# Patient Record
Sex: MALE | Race: WHITE | NOT HISPANIC OR LATINO | Employment: UNEMPLOYED | URBAN - METROPOLITAN AREA
[De-identification: names, ages, dates, MRNs, and addresses within clinical notes are randomized per-mention and may not be internally consistent; named-entity substitution may affect disease eponyms.]

---

## 2019-04-23 ENCOUNTER — OCCUPATIONAL HEALTH (OUTPATIENT)
Dept: URGENT CARE | Facility: CLINIC | Age: 49
End: 2019-04-23

## 2019-04-23 PROCEDURE — 80305 PR HAIR COLLECTION ONLY: ICD-10-PCS | Mod: S$GLB,,, | Performed by: EMERGENCY MEDICINE

## 2019-04-23 PROCEDURE — 80305 DRUG TEST PRSMV DIR OPT OBS: CPT | Mod: S$GLB,,, | Performed by: EMERGENCY MEDICINE

## 2020-01-14 ENCOUNTER — OCCUPATIONAL HEALTH (OUTPATIENT)
Dept: URGENT CARE | Facility: CLINIC | Age: 50
End: 2020-01-14

## 2020-01-14 PROCEDURE — 80305 PR HAIR COLLECTION ONLY: ICD-10-PCS | Mod: S$GLB,,, | Performed by: EMERGENCY MEDICINE

## 2020-01-14 PROCEDURE — 80305 DRUG TEST PRSMV DIR OPT OBS: CPT | Mod: S$GLB,,, | Performed by: EMERGENCY MEDICINE

## 2023-08-28 ENCOUNTER — OCCUPATIONAL HEALTH (OUTPATIENT)
Dept: URGENT CARE | Facility: CLINIC | Age: 53
End: 2023-08-28

## 2023-08-28 DIAGNOSIS — Z13.9 ENCOUNTER FOR SCREENING: Primary | ICD-10-CM

## 2023-08-28 LAB — COLLECTION ONLY: NORMAL

## 2023-08-28 PROCEDURE — 80305 DRUG TEST PRSMV DIR OPT OBS: CPT | Mod: S$GLB,,, | Performed by: EMERGENCY MEDICINE

## 2023-08-28 PROCEDURE — 80305 MEDTOX HAIR COLLECTION ONLY: ICD-10-PCS | Mod: S$GLB,,, | Performed by: EMERGENCY MEDICINE

## 2024-08-20 ENCOUNTER — HOSPITAL ENCOUNTER (OUTPATIENT)
Dept: PREADMISSION TESTING | Facility: HOSPITAL | Age: 54
Discharge: HOME | End: 2024-08-20
Attending: INTERNAL MEDICINE
Payer: COMMERCIAL

## 2024-08-20 DIAGNOSIS — Z01.818 PRE-OP TESTING: Primary | ICD-10-CM

## 2024-08-20 PROCEDURE — 93005 ELECTROCARDIOGRAM TRACING: CPT | Performed by: GENERAL PRACTICE

## 2024-08-20 PROCEDURE — 93010 ELECTROCARDIOGRAM REPORT: CPT | Mod: ,,, | Performed by: GENERAL PRACTICE

## 2024-08-20 RX ORDER — MIRTAZAPINE 15 MG/1
15 TABLET, FILM COATED ORAL NIGHTLY
COMMUNITY
Start: 2024-02-01

## 2024-08-20 RX ORDER — PANTOPRAZOLE SODIUM 40 MG/1
40 TABLET, DELAYED RELEASE ORAL 2 TIMES DAILY
COMMUNITY

## 2024-08-20 NOTE — DISCHARGE INSTRUCTIONS
INSTRUCTIONS  To confirm your doctor has scheduled your surgery for: 08/22    Morning of surgery please check in with registration near [facility] Entrance then proceed to Registration then to endoscopy department    ENDOSCOPY NURSES will call the afternoon prior to procedure with your final arrival time.    TAKE ONLY THESE MEDICATIONS WITH A SMALL SIP OF WATER THE MORNING OF SURGERY:    DO NOT TAKE ANY INSULIN OR ORAL DIABETIC MEDICATIONS THE MORNING OF SURGERY UNLESS DIRECTED BY YOUR PHYSICIAN OR PRE ADMIT NURSE.    DO NOT TAKE THESE MEDICATIONS 5-7 DAYS PRIOR to your procedure per your surgeon's request: ASPIRIN, ALEVE, BC powder, GORDO SELTZER, IBUPROFEN, FISH OIL, VITAMIN E, OR HERBALS   (May take Tylenol)    If you are prescribed any types of blood thinners (Aspirin, Coumadin, Plavix, Pradaxa, Xarelto, Aggrenox, Effient, Eliquis, Savasya, Brilinta or any other), please ask your surgeon how many days before scheduled procedure should you stop taking them. You may also need to verify with prescribing physician if it is OK to stop your blood thinners.      INSTRUCTIONS IMPORTANT!!  Do not eat or drink anything between midnight and the time of your procedure- this includes gum, mints, and candy. You may brush your teeth but do not swallow.  ONLY if you are diabetic, check your sugar in the morning before your procedure.  Do not smoke, vape or drink alcoholic beverages 24 hours prior to your procedure.  If you wear contact lenses, dentures, hearing aids or glasses, bring a container to put them in during surgery and give to a family member for safe keeping.    Please leave all jewelry, piercing's and valuables at home.   Wear comfortable loose clothing and rubber soled shoes.  If your condition changes such as fever, cough, etc, please notify your surgeon.   ONLY for patients requiring bowel prep, written instructions will be given by your doctor's office.  Make arrangements in advance for transportation home by  a responsible adult.  You must make arrangements for transportation, TAXI'S, UBER'S OR LYFTS ARE NOT ALLOWED.        If you have any questions about these instructions, call Endoscopy Nurse at [phone]

## 2024-08-22 ENCOUNTER — ANESTHESIA (OUTPATIENT)
Dept: SURGERY | Facility: HOSPITAL | Age: 54
End: 2024-08-22
Payer: COMMERCIAL

## 2024-08-22 ENCOUNTER — HOSPITAL ENCOUNTER (OUTPATIENT)
Facility: HOSPITAL | Age: 54
Discharge: HOME | End: 2024-08-22
Attending: INTERNAL MEDICINE | Admitting: INTERNAL MEDICINE
Payer: COMMERCIAL

## 2024-08-22 ENCOUNTER — ANESTHESIA EVENT (OUTPATIENT)
Dept: SURGERY | Facility: HOSPITAL | Age: 54
End: 2024-08-22
Payer: COMMERCIAL

## 2024-08-22 VITALS
HEART RATE: 62 BPM | OXYGEN SATURATION: 98 % | DIASTOLIC BLOOD PRESSURE: 74 MMHG | SYSTOLIC BLOOD PRESSURE: 105 MMHG | RESPIRATION RATE: 16 BRPM | TEMPERATURE: 98 F | OXYGEN SATURATION: 98 % | HEART RATE: 72 BPM | DIASTOLIC BLOOD PRESSURE: 56 MMHG | SYSTOLIC BLOOD PRESSURE: 110 MMHG | RESPIRATION RATE: 18 BRPM

## 2024-08-22 DIAGNOSIS — Z12.11 SCREENING FOR COLON CANCER: ICD-10-CM

## 2024-08-22 LAB
OHS QRS DURATION: 98 MS
OHS QTC CALCULATION: 427 MS

## 2024-08-22 PROCEDURE — 27200997: Performed by: INTERNAL MEDICINE

## 2024-08-22 PROCEDURE — 27202438 HC MUCOSAL LIFTING AGENT: Performed by: INTERNAL MEDICINE

## 2024-08-22 PROCEDURE — 45380 COLONOSCOPY AND BIOPSY: CPT | Mod: PT,59 | Performed by: INTERNAL MEDICINE

## 2024-08-22 PROCEDURE — 37000008 HC ANESTHESIA 1ST 15 MINUTES: Performed by: INTERNAL MEDICINE

## 2024-08-22 PROCEDURE — 25000003 PHARM REV CODE 250: Performed by: NURSE ANESTHETIST, CERTIFIED REGISTERED

## 2024-08-22 PROCEDURE — 27201089 HC SNARE, DISP (ANY): Performed by: INTERNAL MEDICINE

## 2024-08-22 PROCEDURE — 27201028 HC NEEDLE, SCLERO: Performed by: INTERNAL MEDICINE

## 2024-08-22 PROCEDURE — 37000009 HC ANESTHESIA EA ADD 15 MINS: Performed by: INTERNAL MEDICINE

## 2024-08-22 PROCEDURE — 45385 COLONOSCOPY W/LESION REMOVAL: CPT | Mod: PT | Performed by: INTERNAL MEDICINE

## 2024-08-22 PROCEDURE — 45390 COLONOSCOPY W/RESECTION: CPT | Mod: PT,59 | Performed by: INTERNAL MEDICINE

## 2024-08-22 PROCEDURE — 27201114 HC TRAP (ANY): Performed by: INTERNAL MEDICINE

## 2024-08-22 PROCEDURE — 27202343 HC ENDOSCOPIC MARKER: Performed by: INTERNAL MEDICINE

## 2024-08-22 PROCEDURE — 27200043 HC FORCEPS, BIOPSY: Performed by: INTERNAL MEDICINE

## 2024-08-22 PROCEDURE — 63600175 PHARM REV CODE 636 W HCPCS: Performed by: NURSE ANESTHETIST, CERTIFIED REGISTERED

## 2024-08-22 RX ORDER — PROPOFOL 10 MG/ML
VIAL (ML) INTRAVENOUS
Status: DISCONTINUED | OUTPATIENT
Start: 2024-08-22 | End: 2024-08-22

## 2024-08-22 RX ADMIN — PROPOFOL 50 MG: 10 INJECTION, EMULSION INTRAVENOUS at 10:08

## 2024-08-22 RX ADMIN — PROPOFOL 100 MG: 10 INJECTION, EMULSION INTRAVENOUS at 10:08

## 2024-08-22 RX ADMIN — PROPOFOL 50 MG: 10 INJECTION, EMULSION INTRAVENOUS at 11:08

## 2024-08-22 RX ADMIN — SODIUM CHLORIDE: 0.9 INJECTION, SOLUTION INTRAVENOUS at 10:08

## 2024-08-22 NOTE — TRANSFER OF CARE
Anesthesia Transfer of Care Note    Patient: Michael Omer Jr.    Procedure(s) Performed: Procedure(s) (LRB):  COLONOSCOPY (N/A)    Patient location: GI    Anesthesia Type: general    Transport from OR: Transported from OR on room air with adequate spontaneous ventilation    Post pain: adequate analgesia    Post assessment: no apparent anesthetic complications    Post vital signs: stable    Level of consciousness: awake and alert    Nausea/Vomiting: no nausea/vomiting    Complications: none    Transfer of care protocol was followed    Last vitals: Visit Vitals  /76   Pulse 79   Temp 36.6 °C (97.8 °F)   Resp 18   SpO2 96%

## 2024-08-22 NOTE — ANESTHESIA POSTPROCEDURE EVALUATION
Anesthesia Post Evaluation    Patient: Michael Omer Jr.    Procedure(s) Performed: Procedure(s) (LRB):  COLONOSCOPY (N/A)    Final Anesthesia Type: general      Patient location during evaluation: GI PACU  Patient participation: Yes- Able to Participate  Level of consciousness: awake and alert and oriented  Post-procedure vital signs: reviewed and stable  Pain management: adequate  Airway patency: patent    PONV status at discharge: No PONV  Anesthetic complications: no      Cardiovascular status: blood pressure returned to baseline, hemodynamically stable and stable  Respiratory status: unassisted, spontaneous ventilation and room air  Hydration status: euvolemic  Follow-up not needed.              Vitals Value Taken Time   /56 08/22/24 1137   Temp 36.5 °C (97.7 °F) 08/22/24 1120   Pulse 60 08/22/24 1138   Resp 16 08/22/24 1209   SpO2 97 % 08/22/24 1138   Vitals shown include unfiled device data.      Event Time   Out of Recovery 11:53:19         Pain/Jalen Score: No data recorded

## 2024-08-22 NOTE — ANESTHESIA PREPROCEDURE EVALUATION
08/22/2024  Michael Omer Jr. is a 53 y.o., male.    There is no problem list on file for this patient.      Past Surgical History:   Procedure Laterality Date    KNEE SURGERY Left     SHOULDER SURGERY Left 1996    reconstruction        Tobacco Use:  The patient  reports that he has been smoking vaping with nicotine. He uses smokeless tobacco.     Results for orders placed or performed during the hospital encounter of 08/20/24   EKG 12-lead    Collection Time: 08/20/24  9:22 AM   Result Value Ref Range    QRS Duration 98 ms    [facility] QTC Calculation 427 ms    Narrative    Test Reason : Z01.818,    Vent. Rate : 066 BPM     Atrial Rate : 066 BPM     P-R Int : 146 ms          QRS Dur : 098 ms      QT Int : 408 ms       P-R-T Axes : 062 049 039 degrees     QTc Int : 427 ms    Normal sinus rhythm  Minimal voltage criteria for LVH, may be normal variant ( Sokolow-Bullard )  Borderline Abnormal ECG  No previous ECGs available    Referred By:             Confirmed By:                No results found for this or any previous visit.           Pre-op Assessment    I have reviewed the Patient Summary Reports.     I have reviewed the Nursing Notes. I have reviewed the NPO Status.   I have reviewed the Medications.     Review of Systems  Anesthesia Hx:  No problems with previous Anesthesia   Neg history of prior surgery.          Denies Family Hx of Anesthesia complications.    Denies Personal Hx of Anesthesia complications.                    Social:  No Alcohol Use, Smoker       Hematology/Oncology:  Hematology Normal   Oncology Normal                                   EENT/Dental:  EENT/Dental Normal           Cardiovascular:  Cardiovascular Normal                                            Pulmonary:  Pulmonary Normal                       Renal/:  Renal/ Normal                 Hepatic/GI:  Hepatic/GI Normal                  Musculoskeletal:  Musculoskeletal Normal                Neurological:  Neurology Normal                                      Endocrine:  Endocrine Normal            Dermatological:  Skin Normal    Psych:    depression                Physical Exam  General: Well nourished and Alert    Airway:  Mallampati: II   Mouth Opening: Normal  TM Distance: Normal  Tongue: Normal  Neck ROM: Normal ROM    Dental:  Intact    Chest/Lungs:  Clear to auscultation, Normal Respiratory Rate    Heart:  Rate: Normal  Rhythm: Regular Rhythm  Sounds: Normal        Anesthesia Plan  Type of Anesthesia, risks & benefits discussed:    Anesthesia Type: Gen Natural Airway  Intra-op Monitoring Plan: Standard ASA Monitors  Induction:  IV  Informed Consent: Patient consented to blood products? Yes  ASA Score: 2    Ready For Surgery From Anesthesia Perspective.     .

## 2024-08-22 NOTE — [employer] PATIENT INSTRUCTIONS
Discharge Summary/Instructions after an Endoscopic Procedure  Patient Name: Michael Omer  Patient MRN: [id number]  Patient YOB: 1970 Thursday, August 22, 2024  Paul Ventura MD  RESTRICTIONS:  During your procedure today, you received medications for sedation.  These   medications may affect your judgment, balance and coordination.  Therefore,   for 24 hours, you have the following restrictions:   - DO NOT drive a car, operate machinery, make legal/financial decisions,   sign important papers or drink alcohol.    ACTIVITY:  Today: no heavy lifting, straining or running due to procedural   sedation/anesthesia.  The following day: return to full activity including work.  DIET:  Eat and drink normally unless instructed otherwise.     TREATMENT FOR COMMON SIDE EFFECTS:  - Mild abdominal pain, nausea, belching, bloating or excessive gas:  rest,   eat lightly and use a heating pad.  - Sore Throat: treat with throat lozenges and/or gargle with warm salt   water.  - Because air was used during the procedure, expelling large amounts of air   from your rectum or belching is normal.  - If a bowel prep was taken, you may not have a bowel movement for 1-3 days.    This is normal.  SYMPTOMS TO WATCH FOR AND REPORT TO YOUR PHYSICIAN:  1. Abdominal pain or bloating, other than gas cramps.  2. Chest pain.  3. Back pain.  4. Signs of infection such as: chills or fever occurring within 24 hours   after the procedure.  5. Rectal bleeding, which would show as bright red, maroon, or black stools.   (A tablespoon of blood from the rectum is not serious, especially if   hemorrhoids are present.)  6. Vomiting.  7. Weakness or dizziness.  GO DIRECTLY TO THE NEAREST EMERGENCY ROOM IF YOU HAVE ANY OF THE FOLLOWING:      Difficulty breathing              Chills and/or fever over 101 F   Persistent vomiting and/or vomiting blood   Severe abdominal pain   Severe chest pain   Black, tarry stools   Bleeding- more than one  tablespoon   Any other symptom or condition that you feel may need urgent attention  Your doctor recommends these additional instructions:  If any biopsies were taken, your doctors clinic will contact you in 1 to 2   weeks with any results.  - Patient has a contact number available for emergencies.  The signs and   symptoms of potential delayed complications were discussed with the   patient.  Return to normal activities tomorrow.  Written discharge   instructions were provided to the patient.   - Resume previous diet.   - Continue present medications.   - Await pathology results.   - Repeat colonoscopy in 6 months for surveillance.   - Follow up in clinic in 2-3 weeks after studies complete for weight loss   history.  - Avoid NSAIDS  - Discharge patient to home (with escort).  For questions, problems or results please call your physician - Paul Ventura MD at Work:  [phone].  Replaced by [facility], EMERGENCY ROOM PHONE NUMBER: [phone]  IF A COMPLICATION OR EMERGENCY SITUATION ARISES AND YOU ARE UNABLE TO REACH   YOUR PHYSICIAN - GO DIRECTLY TO THE EMERGENCY ROOM.  MD Paul Márquez MD  8/22/2024 11:20:31 AM  This report has been verified and signed electronically.  Dear patient,  As a result of recent federal legislation (The Federal Cures Act), you may   receive lab or pathology results from your procedure in your [facility]   account before your physician is able to contact you. Your physician or   their representative will relay the results to you with their   recommendations at their soonest availability.  Thank you,  [employer]

## 2024-10-02 ENCOUNTER — HOSPITAL ENCOUNTER (EMERGENCY)
Facility: HOSPITAL | Age: 54
Discharge: HOME | End: 2024-10-02
Attending: EMERGENCY MEDICINE
Payer: COMMERCIAL

## 2024-10-02 VITALS
TEMPERATURE: 98 F | OXYGEN SATURATION: 99 % | HEIGHT: 68 IN | WEIGHT: 139 LBS | SYSTOLIC BLOOD PRESSURE: 126 MMHG | DIASTOLIC BLOOD PRESSURE: 64 MMHG | HEART RATE: 78 BPM | BODY MASS INDEX: 21.07 KG/M2 | RESPIRATION RATE: 18 BRPM

## 2024-10-02 DIAGNOSIS — K08.89 PAIN, DENTAL: ICD-10-CM

## 2024-10-02 DIAGNOSIS — K04.7 DENTAL ABSCESS: Primary | ICD-10-CM

## 2024-10-02 PROCEDURE — 63600175 PHARM REV CODE 636 W HCPCS: Performed by: PHYSICIAN ASSISTANT

## 2024-10-02 PROCEDURE — 96372 THER/PROPH/DIAG INJ SC/IM: CPT | Performed by: PHYSICIAN ASSISTANT

## 2024-10-02 PROCEDURE — 99284 EMERGENCY DEPT VISIT MOD MDM: CPT | Mod: 25

## 2024-10-02 PROCEDURE — 25000003 PHARM REV CODE 250: Performed by: PHYSICIAN ASSISTANT

## 2024-10-02 RX ORDER — IBUPROFEN 800 MG/1
800 TABLET ORAL EVERY 6 HOURS PRN
Qty: 20 TABLET | Refills: 0 | Status: SHIPPED | OUTPATIENT
Start: 2024-10-02

## 2024-10-02 RX ORDER — AMOXICILLIN AND CLAVULANATE POTASSIUM 875; 125 MG/1; MG/1
1 TABLET, FILM COATED ORAL 2 TIMES DAILY
Qty: 14 TABLET | Refills: 0 | Status: SHIPPED | OUTPATIENT
Start: 2024-10-02

## 2024-10-02 RX ORDER — KETOROLAC TROMETHAMINE 30 MG/ML
30 INJECTION, SOLUTION INTRAMUSCULAR; INTRAVENOUS
Status: COMPLETED | OUTPATIENT
Start: 2024-10-02 | End: 2024-10-02

## 2024-10-02 RX ORDER — AMOXICILLIN AND CLAVULANATE POTASSIUM 875; 125 MG/1; MG/1
1 TABLET, FILM COATED ORAL
Status: COMPLETED | OUTPATIENT
Start: 2024-10-02 | End: 2024-10-02

## 2024-10-02 RX ADMIN — KETOROLAC TROMETHAMINE 30 MG: 30 INJECTION, SOLUTION INTRAMUSCULAR; INTRAVENOUS at 04:10

## 2024-10-02 RX ADMIN — AMOXICILLIN AND CLAVULANATE POTASSIUM 1 TABLET: 875; 125 TABLET, FILM COATED ORAL at 04:10

## 2024-10-03 NOTE — ED PROVIDER NOTES
Encounter Date: 10/2/2024       History     Chief Complaint   Patient presents with    Dental Pain     Reports dental abscess to left lower teeth     Michael Omer Jr. is a 53 y.o. male presenting for evaluation of left-sided lower dental pain, persistent for the last few days.  He states he has a history of dental pain in that same region and plans to follow up with his dentist.  No fever, no chills.  No difficulty swallowing.  He has noticed no drainage or bleeding from the tooth.  He has a past medical history of Depression.      The history is provided by the patient.     Review of patient's allergies indicates:   Allergen Reactions    Wellbutrin [bupropion hcl] Other (See Comments)     Caused seizures     Past Medical History:   Diagnosis Date    Depression      Past Surgical History:   Procedure Laterality Date    COLONOSCOPY N/A 8/22/2024    Procedure: COLONOSCOPY;  Surgeon: Paul Ventura MD;  [facility]: [facility];  Service: Endoscopy;  Laterality: N/A;    KNEE SURGERY Left     SHOULDER SURGERY Left 1996    reconstruction     No family history on file.  Social History     Tobacco Use    Smoking status: Every Day     Types: Vaping with nicotine    Smokeless tobacco: Current   Substance Use Topics    Alcohol use: No    Drug use: Yes     Types: Marijuana     Review of Systems   Constitutional:  Negative for chills and fever.   HENT:  Positive for dental problem. Negative for congestion, ear discharge, ear pain, rhinorrhea, sinus pressure and trouble swallowing.    Respiratory:  Negative for cough, chest tightness, shortness of breath and wheezing.    Cardiovascular:  Negative for chest pain and palpitations.   Gastrointestinal:  Negative for nausea and vomiting.   Musculoskeletal:  Negative for arthralgias, back pain, joint swelling, myalgias, neck pain and neck stiffness.   Skin:  Negative for color change, pallor, rash and wound.   Neurological:  Negative for dizziness, weakness, numbness and headaches.    Hematological:  Does not bruise/bleed easily.       Physical Exam     Initial Vitals [10/02/24 1416]   BP Pulse Resp Temp SpO2   126/64 85 17 98.4 °F (36.9 °C) 98 %      MAP       --         Physical Exam    Nursing note and vitals reviewed.  Constitutional: He appears well-developed and well-nourished. He is not diaphoretic. No distress.   HENT:   Head: Normocephalic and atraumatic.   Right Ear: External ear normal.   Left Ear: External ear normal.   Nose: Nose normal. Mouth/Throat: Uvula is midline and oropharynx is clear and moist. Abnormal dentition.       Swelling and erythema noted to gingiva of left lower posterior molars.  No fluctuance.   Neck: Neck supple.   Normal range of motion.  Cardiovascular:  Normal rate, regular rhythm, normal heart sounds and intact distal pulses.           Pulmonary/Chest: Breath sounds normal. No respiratory distress. He has no wheezes. He has no rhonchi. He has no rales.   Musculoskeletal:         General: No tenderness or edema. Normal range of motion.      Cervical back: Normal range of motion and neck supple.     Neurological: He is alert and oriented to person, place, and time. He has normal strength. No sensory deficit.   Skin: Skin is warm and dry. No rash and no abscess noted. No erythema.         ED Course   Procedures  Labs Reviewed - No data to display       Imaging Results    None          Medications   ketorolac injection 30 mg (30 mg Intramuscular Given 10/2/24 1602)   amoxicillin-clavulanate 875-125mg per tablet 1 tablet (1 tablet Oral Given 10/2/24 1603)     Medical Decision Making  Differential diagnosis:  Dental abscess  Pulpitis  Dental cavity    Pt emergently evaluated here in the ED.    Symptoms consistent with dental pain and associated possible early dental abscess.  We will treat with antibiotics.  He will be discharged home to follow up with his dentist for re-evaluation and further management.  He voices understanding is agreeable with the plan.  He  is given specific return precautions.  No trismus, pooling of secretions or abnormal phonation.  Low suspicion for deeper space infection and no indication for further imaging or testing at this time.    Risk  OTC drugs.  Prescription drug management.                                      Clinical Impression:  Final diagnoses:  [K04.7] Dental abscess (Primary)  [K08.89] Pain, dental          ED Disposition Condition    Discharge Stable          ED Prescriptions       Medication Sig Dispense Start Date End Date Auth. Provider    amoxicillin-clavulanate 875-125mg (AUGMENTIN) 875-125 mg per tablet Take 1 tablet by mouth 2 (two) times daily. 14 tablet 10/2/2024 -- Joann Loepz PA-C    ibuprofen (ADVIL,MOTRIN) 800 MG tablet Take 1 tablet (800 mg total) by mouth every 6 (six) hours as needed for Pain. 20 tablet 10/2/2024 -- Joann Lopez PA-C          Follow-up Information       Follow up With Specialties Details Why Contact Info Additional Information    [facility] - ED Emergency Medicine  As needed, If symptoms worsen [address] [place] [address] [address] [place] [address] 1st floor             Joann Lopez PA-C  10/02/24 0230

## 2024-10-19 ENCOUNTER — HOSPITAL ENCOUNTER (EMERGENCY)
Facility: HOSPITAL | Age: 54
Discharge: HOME | End: 2024-10-19
Attending: EMERGENCY MEDICINE
Payer: COMMERCIAL

## 2024-10-19 VITALS
BODY MASS INDEX: 21.07 KG/M2 | HEART RATE: 78 BPM | RESPIRATION RATE: 18 BRPM | HEIGHT: 68 IN | WEIGHT: 139 LBS | DIASTOLIC BLOOD PRESSURE: 70 MMHG | TEMPERATURE: 98 F | SYSTOLIC BLOOD PRESSURE: 116 MMHG | OXYGEN SATURATION: 98 %

## 2024-10-19 DIAGNOSIS — K08.89 TOOTHACHE: ICD-10-CM

## 2024-10-19 DIAGNOSIS — K08.9 CHRONIC DENTAL PAIN: Primary | ICD-10-CM

## 2024-10-19 DIAGNOSIS — G89.29 CHRONIC DENTAL PAIN: Primary | ICD-10-CM

## 2024-10-19 PROBLEM — F41.9 ANXIETY: Status: ACTIVE | Noted: 2024-06-17

## 2024-10-19 PROCEDURE — 99284 EMERGENCY DEPT VISIT MOD MDM: CPT

## 2024-10-19 RX ORDER — KETOROLAC TROMETHAMINE 10 MG/1
10 TABLET, FILM COATED ORAL EVERY 8 HOURS PRN
Qty: 6 TABLET | Refills: 0 | Status: SHIPPED | OUTPATIENT
Start: 2024-10-19

## 2024-10-19 RX ORDER — PENICILLIN V POTASSIUM 500 MG/1
500 TABLET, FILM COATED ORAL 4 TIMES DAILY
Qty: 40 TABLET | Refills: 0 | Status: SHIPPED | OUTPATIENT
Start: 2024-10-19 | End: 2024-10-29

## 2024-10-19 NOTE — ED PROVIDER NOTES
Encounter Date: 10/19/2024       History     Chief Complaint   Patient presents with    Dental Pain     Completed antibiotics for same issue      53-year-old male presents complaining of dental pain to the left lower mandibular molar area.  This has been ongoing now for several months.  He has had several ER visits and has completed a course or 2 of antibiotics.  He states he finished antibiotics for this last week which helped.  Has not however seen a dentist as has been discussed with him.  He reports pain to this area with hot or cold food or drink.  He denies any fever, facial pain or swelling, nausea vomiting malaise or any constitutional symptoms.  Has no neck pain or submandibular pain or swelling.  Denies any chest pain or shortness of breath.  Any eating makes the pain worse.  He is able to eat drink and swallow.  He denies any other problems or complaints.        Review of patient's allergies indicates:   Allergen Reactions    Wellbutrin [bupropion hcl] Other (See Comments)     Caused seizures     Past Medical History:   Diagnosis Date    Depression      Past Surgical History:   Procedure Laterality Date    COLONOSCOPY N/A 8/22/2024    Procedure: COLONOSCOPY;  Surgeon: Paul Ventura MD;  Location: [facility];  Service: Endoscopy;  Laterality: N/A;    KNEE SURGERY Left     SHOULDER SURGERY Left 1996    reconstruction     No family history on file.  Social History     Tobacco Use    Smoking status: Every Day     Types: Vaping with nicotine    Smokeless tobacco: Current   Substance Use Topics    Alcohol use: No    Drug use: Yes     Types: Marijuana     Review of Systems   Constitutional: Negative.  Negative for activity change, appetite change, chills, fatigue and fever.   HENT:  Positive for dental problem. Negative for congestion, drooling, ear pain, facial swelling, sore throat, trouble swallowing and voice change.    Respiratory: Negative.  Negative for shortness of breath.    Cardiovascular: Negative.   Negative for chest pain.   Gastrointestinal: Negative.  Negative for nausea and vomiting.   Genitourinary: Negative.  Negative for dysuria.   Musculoskeletal: Negative.  Negative for back pain and myalgias.   Skin: Negative.  Negative for rash.   Neurological: Negative.  Negative for dizziness, facial asymmetry, weakness, light-headedness and headaches.   Hematological:  Does not bruise/bleed easily.   All other systems reviewed and are negative.      Physical Exam     Initial Vitals [10/19/24 0717]   BP Pulse Resp Temp SpO2   116/70 78 18 97.9 °F (36.6 °C) 98 %      MAP       --         Physical Exam    Nursing note and vitals reviewed.  Constitutional: He is cooperative. He does not have a sickly appearance. He does not appear ill. No distress.   HENT:   Head: Normocephalic and atraumatic.   Nose: Nose normal. Mouth/Throat: Uvula is midline, oropharynx is clear and moist and mucous membranes are normal. No oral lesions. No trismus in the jaw. No dental abscesses or uvula swelling. No oropharyngeal exudate, posterior oropharyngeal edema, posterior oropharyngeal erythema or tonsillar abscesses.       Pain to dental percussion of the left 1st and 2nd molar, gingiva to the buccal area is slightly inflamed and mildly erythematous, no abscess noted, no trismus noted, airway is widely patent, the floor of the mouth is soft and not elevated.  There is no associated submandibular edema or tenderness there is no associated facial tenderness.  Airway is widely patent.   Eyes: Conjunctivae, EOM and lids are normal. Pupils are equal, round, and reactive to light.   Neck: Trachea normal and phonation normal. Neck supple. No stridor present. No tracheal deviation present. No JVD present.   Normal range of motion.   Full passive range of motion without pain.     Cardiovascular:  Normal rate, regular rhythm, normal heart sounds, intact distal pulses and normal pulses.     Exam reveals no gallop, no distant heart sounds and no  friction rub.       No murmur heard.  Pulmonary/Chest: Effort normal and breath sounds normal. No stridor.   Musculoskeletal:         General: No tenderness or edema. Normal range of motion.      Right hand: Normal.      Left hand: Normal.      Cervical back: Normal, full passive range of motion without pain, normal range of motion and neck supple. No edema, erythema, tenderness or bony tenderness. No spinous process tenderness or muscular tenderness. Normal range of motion and normal range of motion. Normal.      Right lower leg: No edema.      Left lower leg: No edema.     Lymphadenopathy:        Head (right side): No submental, no submandibular, no preauricular and no posterior auricular adenopathy present.        Head (left side): No submental, no submandibular, no preauricular and no posterior auricular adenopathy present.     He has no cervical adenopathy.   Neurological: He is alert and oriented to person, place, and time. No cranial nerve deficit.   No focal deficits   Skin: Skin is warm and dry. Capillary refill takes less than 2 seconds. No rash noted. No erythema. No pallor.   Psychiatric: He has a normal mood and affect. His speech is normal and behavior is normal.         ED Course   Procedures  Labs Reviewed - No data to display       Imaging Results    None          Medications - No data to display  Medical Decision Making  Have had an extensive detail discussion with the patient regarding the importance of evaluation by a dentist.  I have explained that this problem will not improve until you CBC appropriate specialist.  Repeated antibiotic therapy also could place him at risk for Clostridium difficile colitis.  He denies any abdominal pain or diarrhea.  He does have some gingival inflammation on exam but there is no abscess.  Will treat with a course of penicillin VK.  He has asked for pain medication.  We will give a prescription for Toradol as well.  Patient has no underlying renal disease,  history of GI bleeds or GERD.  He tolerates NSAIDs well by his report.  I did explain that no other NSAIDs could be taken while taking this medication.  I have discussed dental options with him.  I have urged him to see the appropriate specialist and discharge instructions have been given and return precautions discussed in detail.    Risk  Prescription drug management.                                      Clinical Impression:  Final diagnoses:  [K08.9, G89.29] Chronic dental pain (Primary)  [K08.89] Toothache          ED Disposition Condition    Discharge Stable          ED Prescriptions       Medication Sig Dispense Start Date End Date Auth. Provider    penicillin v potassium (VEETID) 500 MG tablet Take 1 tablet (500 mg total) by mouth 4 (four) times daily. for 10 days 40 tablet 10/19/2024 10/29/2024 Kacy Quintanilla MD    ketorolac (TORADOL) 10 mg tablet Take 1 tablet (10 mg total) by mouth every 8 (eight) hours as needed for Pain. 6 tablet 10/19/2024 -- Kacy Quintanilla MD          Follow-up Information       Follow up With Specialties Details Why Contact Info    Your primary care provider  Schedule an appointment as soon as possible for a visit in 2 days      [facility]  Schedule an appointment as soon as possible for a visit in 3 days Or see your primary care provider. [address]  [place] [id number] [phone]             Kacy Quintanilla MD  10/20/24 0806

## 2024-10-19 NOTE — DISCHARGE INSTRUCTIONS
Please read and follow discharge instructions and return precautions.  Rest, avoid any strenuous activity, over exertion or overheating while taking antibiotics..  Keep well hydrated.  You may attempt Tylenol over-the-counter as directed if needed for pain.  Toradol as directed if pain is not controlled with Tylenol--take with food and discontinue if gastrointestinal upset occurs.  Do not take any other anti-inflammatory medication such as Advil or a leave while taking Toradol.  Pen VK as directed.  Very important to see a DENTIST within the next 1-3 days.  This problem is not going to resolve until you see a dentist.  Please reach out to your local dentists and reach out to \[facility]\"" [facility] to obtain an appointment.  Use peroxide gel over-the-counter mouthwash every time after you eat.  Use a tooth paste for sensitive teeth such as Sensodyne.   Return immediately if you develop new or worsening symptoms or if you have new problems or concerns.

## 2024-12-13 ENCOUNTER — OFFICE VISIT (OUTPATIENT)
Dept: FAMILY MEDICINE | Facility: CLINIC | Age: 54
End: 2024-12-13
Payer: COMMERCIAL

## 2024-12-13 VITALS
OXYGEN SATURATION: 98 % | WEIGHT: 130.5 LBS | SYSTOLIC BLOOD PRESSURE: 138 MMHG | DIASTOLIC BLOOD PRESSURE: 78 MMHG | HEIGHT: 68 IN | HEART RATE: 78 BPM | BODY MASS INDEX: 19.78 KG/M2 | TEMPERATURE: 98 F

## 2024-12-13 DIAGNOSIS — J02.9 SORE THROAT: ICD-10-CM

## 2024-12-13 DIAGNOSIS — R79.89 LOW TESTOSTERONE IN MALE: ICD-10-CM

## 2024-12-13 DIAGNOSIS — R63.0 DECREASED APPETITE: ICD-10-CM

## 2024-12-13 DIAGNOSIS — Z76.89 ENCOUNTER TO ESTABLISH CARE: Primary | ICD-10-CM

## 2024-12-13 DIAGNOSIS — E78.49 OTHER HYPERLIPIDEMIA: ICD-10-CM

## 2024-12-13 DIAGNOSIS — G47.00 INSOMNIA, UNSPECIFIED TYPE: ICD-10-CM

## 2024-12-13 DIAGNOSIS — R05.1 ACUTE COUGH: ICD-10-CM

## 2024-12-13 DIAGNOSIS — Z11.59 NEED FOR HEPATITIS C SCREENING TEST: ICD-10-CM

## 2024-12-13 DIAGNOSIS — J01.40 ACUTE NON-RECURRENT PANSINUSITIS: ICD-10-CM

## 2024-12-13 DIAGNOSIS — R35.1 NOCTURIA: ICD-10-CM

## 2024-12-13 LAB
CTP QC/QA: YES
MOLECULAR STREP A: NEGATIVE
POC MOLECULAR INFLUENZA A AGN: NEGATIVE
POC MOLECULAR INFLUENZA B AGN: NEGATIVE
SARS-COV-2 RDRP RESP QL NAA+PROBE: NEGATIVE

## 2024-12-13 PROCEDURE — 99999 PR PBB SHADOW E&M-EST. PATIENT-LVL IV: CPT | Mod: PBBFAC,,, | Performed by: NURSE PRACTITIONER

## 2024-12-13 RX ORDER — MEGESTROL ACETATE 40 MG/ML
400 SUSPENSION ORAL DAILY
Qty: 300 ML | Refills: 1 | Status: SHIPPED | OUTPATIENT
Start: 2024-12-13

## 2024-12-13 RX ORDER — TAMSULOSIN HYDROCHLORIDE 0.4 MG/1
0.4 CAPSULE ORAL DAILY
Qty: 30 CAPSULE | Refills: 11 | Status: SHIPPED | OUTPATIENT
Start: 2024-12-13

## 2024-12-13 RX ORDER — FLUTICASONE PROPIONATE 50 MCG
1 SPRAY, SUSPENSION (ML) NASAL DAILY
Qty: 16 G | Refills: 0 | Status: SHIPPED | OUTPATIENT
Start: 2024-12-13

## 2024-12-13 RX ORDER — HYDROCORTISONE 25 MG/G
CREAM TOPICAL 2 TIMES DAILY
COMMUNITY
Start: 2024-07-29

## 2024-12-13 RX ORDER — CETIRIZINE HYDROCHLORIDE 10 MG/1
10 TABLET ORAL DAILY
Qty: 30 TABLET | Refills: 0 | Status: SHIPPED | OUTPATIENT
Start: 2024-12-13

## 2024-12-13 RX ORDER — MIRTAZAPINE 15 MG/1
15 TABLET, FILM COATED ORAL NIGHTLY
Qty: 90 TABLET | Refills: 1 | Status: SHIPPED | OUTPATIENT
Start: 2024-12-13

## 2024-12-13 RX ORDER — DOXYCYCLINE HYCLATE 100 MG
100 TABLET ORAL 2 TIMES DAILY
Qty: 14 TABLET | Refills: 0 | Status: SHIPPED | OUTPATIENT
Start: 2024-12-13 | End: 2024-12-20

## 2024-12-13 RX ORDER — TADALAFIL 20 MG/1
TABLET ORAL
COMMUNITY
Start: 2024-01-12

## 2024-12-13 NOTE — PROGRESS NOTES
SUBJECTIVE:      Patient ID: Michael Omer Jr. is a 54 y.o. male.    Chief Complaint: Headache (Onset Tuesday. ), Sinus Problem, Sore Throat, Nasal Congestion, and Cough    History of Present Illness             Review of Systems    No family history on file.   Social History     Socioeconomic History    Marital status:    Tobacco Use    Smoking status: Every Day     Types: Vaping with nicotine    Smokeless tobacco: Current   Substance and Sexual Activity    Alcohol use: No    Drug use: Yes     Types: Marijuana     Social Drivers of Health     Financial Resource Strain: Low Risk  (12/12/2024)    Overall Financial Resource Strain (CARDIA)     Difficulty of Paying Living Expenses: Not hard at all   Food Insecurity: Patient Declined (12/12/2024)    Hunger Vital Sign     Worried About Running Out of Food in the Last Year: Patient declined     Ran Out of Food in the Last Year: Patient declined   Physical Activity: Sufficiently Active (12/12/2024)    Exercise Vital Sign     Days of Exercise per Week: 6 days     Minutes of Exercise per Session: 40 min   Stress: Stress Concern Present (12/12/2024)    [employer] [place] [employer] - Occupational Stress Questionnaire     Feeling of Stress : Very much   Housing Stability: Unknown (12/12/2024)    Housing Stability Vital Sign     Unable to Pay for Housing in the Last Year: No     Current Outpatient Medications   Medication Sig Dispense Refill    hydrocortisone 2.5 % cream Apply topically 2 (two) times daily.      mirtazapine (REMERON) 15 MG tablet Take 15 mg by mouth every evening.      pantoprazole (PROTONIX) 40 MG tablet Take 40 mg by mouth 2 (two) times daily.      tadalafiL (CIALIS) 20 MG Tab        No current facility-administered medications for this visit.     Review of patient's allergies indicates:   Allergen Reactions    Bupropion Other (See Comments)     Seizures     Seizures    Venom-honey bee Swelling    Wellbutrin [bupropion hcl] Other  "(See Comments)     Caused seizures      Past Medical History:   Diagnosis Date    Depression      Past Surgical History:   Procedure Laterality Date    COLONOSCOPY N/A 8/22/2024    Procedure: COLONOSCOPY;  Surgeon: Paul Ventura MD;  Location: [facility];  Service: Endoscopy;  Laterality: N/A;    KNEE SURGERY Left     SHOULDER SURGERY Left 1996    reconstruction          OBJECTIVE:      Vitals:    12/13/24 1041   BP: 138/78   BP Location: Left arm   Patient Position: Sitting   Pulse: 78   Temp: 98.3 °F (36.8 °C)   TempSrc: Oral   SpO2: 98%   Weight: 59.2 kg (130 lb 8 oz)   Height: 5' 8" (1.727 m)     Physical Exam       Assessment:       No diagnosis found.    Plan:       Assessment & Plan             There are no diagnoses linked to this encounter.    No follow-ups on file.      12/13/2024 VIVIEN Mejia, RANGELP  This note was generated with the assistance of ambient listening technology. Verbal consent was obtained by the patient and accompanying visitor(s) for the recording of patient appointment to facilitate this note. I attest to having reviewed and edited the generated note for accuracy, though some syntax or spelling errors may persist. Please contact the author of this note for any clarification.         "

## 2024-12-13 NOTE — PROGRESS NOTES
SUBJECTIVE:      Patient ID: Michael Omer Jr. is a 54 y.o. male.    Chief Complaint: Headache (Onset Tuesday. ), Sinus Problem, Sore Throat, Nasal Congestion, and Cough    History of Present Illness    CHIEF COMPLAINT:  Mr. Omer presents with symptoms of sore throat, nausea, headache, and sinus congestion since Tuesday, initially suspected to be allergies.    HPI:  Mr. Omer reports illness onset Tuesday with sore throat, mild nausea, significant headache, and sinus pressure. Symptoms were initially perceived as similar to allergies. He has nasal congestion, productive cough with yellow sputum, and chest congestion. Mr. Omer felt feverish a few days ago, which has since resolved.    Mr. Omer's son had COVID-19 3 weeks ago, but patient did not contract it at that time. He has been using Zyrtec for symptom management without effectiveness. He uses Protonix for stomach discomfort as needed.    Mr. Omer's recent illness history includes COVID-19 in February and influenza A and B from late December to mid-January of last year. Current symptoms are reported to be distinct from these past illnesses.    Urinary symptoms include frequency, weak stream, incomplete bladder emptying, and nocturia.    Mr. Omer reports poor appetite without mirtazapine, which he has been taking since 2014 for sleep and appetite improvement.    Mr. Omer denies recent travel, current fever, chest pain, and shortness of breath.    MEDICATIONS:  Mr. Omer is on Mirtazapine 15 mg daily at night for sleep and appetite stimulation. He is also taking Protonix as needed for abdominal pain. Zyrtec is prescribed for allergy symptoms, but the patient reports it as ineffective.    MEDICAL HISTORY:  Mr. Omer has a history of COVID-19 in February, Influenza A in mid-January, and Influenza B from the last week of December to the first week of January (years not specified). He also has a history of low testosterone since 2009.    FAMILY HISTORY:  Family history  is significant for father who  by suicide.    TEST RESULTS:  Mr. Omer's cholesterol panel in April showed , HDL 44, triglycerides 137, and total cholesterol 173. A previous cholesterol panel in February revealed  and total cholesterol 228. His PSA this year was 0.73.    SOCIAL HISTORY:  Mr. Omer is a former smoker. He reports no current alcohol or drug use. He has a history of steroid abuse for about 6 years, which ended around .        Review of Systems   Constitutional:  Positive for appetite change. Negative for activity change, chills, diaphoresis, fatigue, fever and unexpected weight change.   HENT:  Positive for congestion, postnasal drip, sinus pressure and sore throat. Negative for ear pain, trouble swallowing and voice change.    Eyes:  Negative for pain, discharge and visual disturbance.   Respiratory:  Positive for cough. Negative for chest tightness, shortness of breath and wheezing.    Cardiovascular:  Negative for chest pain and palpitations.   Gastrointestinal:  Negative for abdominal pain, constipation, diarrhea, nausea and vomiting.   Genitourinary:  Positive for difficulty urinating and frequency. Negative for flank pain, penile swelling, scrotal swelling, testicular pain and urgency.   Musculoskeletal:  Negative for back pain and joint swelling.   Skin:  Negative for color change and rash.   Neurological:  Positive for headaches. Negative for dizziness, seizures, syncope, weakness and numbness.   Hematological:  Negative for adenopathy.   Psychiatric/Behavioral:  Negative for dysphoric mood and sleep disturbance. The patient is not nervous/anxious.        No family history on file.   Social History     Socioeconomic History    Marital status:    Tobacco Use    Smoking status: Every Day     Types: Vaping with nicotine    Smokeless tobacco: Current   Substance and Sexual Activity    Alcohol use: No    Drug use: Yes     Types: Marijuana     Social Drivers of Health      Financial Resource Strain: Low Risk  (12/12/2024)    Overall Financial Resource Strain (CARDIA)     Difficulty of Paying Living Expenses: Not hard at all   Food Insecurity: Patient Declined (12/12/2024)    Hunger Vital Sign     Worried About Running Out of Food in the Last Year: Patient declined     Ran Out of Food in the Last Year: Patient declined   Physical Activity: Sufficiently Active (12/12/2024)    Exercise Vital Sign     Days of Exercise per Week: 6 days     Minutes of Exercise per Session: 40 min   Stress: Stress Concern Present (12/12/2024)    [employer] [place] [employer] - Occupational Stress Questionnaire     Feeling of Stress : Very much   Housing Stability: Unknown (12/12/2024)    Housing Stability Vital Sign     Unable to Pay for Housing in the Last Year: No     Current Outpatient Medications   Medication Sig Dispense Refill    hydrocortisone 2.5 % cream Apply topically 2 (two) times daily.      pantoprazole (PROTONIX) 40 MG tablet Take 40 mg by mouth 2 (two) times daily.      tadalafiL (CIALIS) 20 MG Tab       cetirizine (ZYRTEC) 10 MG tablet Take 1 tablet (10 mg total) by mouth once daily. 30 tablet 0    doxycycline (VIBRA-TABS) 100 MG tablet Take 1 tablet (100 mg total) by mouth 2 (two) times daily. for 7 days 14 tablet 0    fluticasone propionate (FLONASE) 50 mcg/actuation nasal spray 1 spray (50 mcg total) by Each Nostril route once daily. 16 g 0    megestroL (MEGACE) 400 mg/10 mL (40 mg/mL) Susp Take 10 mLs (400 mg total) by mouth once daily. 300 mL 1    REMERON 15 mg tablet Take 1 tablet (15 mg total) by mouth every evening. 90 tablet 1    tamsulosin (FLOMAX) 0.4 mg Cap Take 1 capsule (0.4 mg total) by mouth once daily. 30 capsule 11     No current facility-administered medications for this visit.     Review of patient's allergies indicates:   Allergen Reactions    Bupropion Other (See Comments)     Seizures     Seizures    Venom-honey bee Swelling    Wellbutrin [bupropion hcl]  "Other (See Comments)     Caused seizures      Past Medical History:   Diagnosis Date    Depression      Past Surgical History:   Procedure Laterality Date    COLONOSCOPY N/A 8/22/2024    Procedure: COLONOSCOPY;  Surgeon: Paul Ventura MD;  Location: [facility];  Service: Endoscopy;  Laterality: N/A;    KNEE SURGERY Left     SHOULDER SURGERY Left 1996    reconstruction          OBJECTIVE:      Vitals:    12/13/24 1041   BP: 138/78   BP Location: Left arm   Patient Position: Sitting   Pulse: 78   Temp: 98.3 °F (36.8 °C)   TempSrc: Oral   SpO2: 98%   Weight: 59.2 kg (130 lb 8 oz)   Height: 5' 8" (1.727 m)     Physical Exam  Vitals and nursing note reviewed.   Constitutional:       General: He is awake. He is not in acute distress.     Appearance: He is well-developed, well-groomed and normal weight. He is not ill-appearing, toxic-appearing or diaphoretic.   HENT:      Head: Normocephalic and atraumatic.      Right Ear: Tympanic membrane, ear canal and external ear normal.      Left Ear: Tympanic membrane, ear canal and external ear normal.      Nose: Congestion present.      Right Sinus: Maxillary sinus tenderness and frontal sinus tenderness present.      Left Sinus: Maxillary sinus tenderness and frontal sinus tenderness present.      Mouth/Throat:      Comments: Wearing mask  Eyes:      General: Lids are normal. Gaze aligned appropriately.      Conjunctiva/sclera: Conjunctivae normal.      Right eye: Right conjunctiva is not injected.      Left eye: Left conjunctiva is not injected.      Pupils: Pupils are equal, round, and reactive to light.   Neck:      Thyroid: No thyromegaly or thyroid tenderness.   Cardiovascular:      Rate and Rhythm: Normal rate and regular rhythm.      Pulses: Normal pulses.      Heart sounds: Normal heart sounds, S1 normal and S2 normal. No murmur heard.     No friction rub. No gallop.   Pulmonary:      Effort: Pulmonary effort is normal. No respiratory distress.      Breath sounds: " Normal breath sounds. No stridor. No decreased breath sounds, wheezing, rhonchi or rales.   Chest:      Chest wall: No tenderness.   Musculoskeletal:      Cervical back: Neck supple.      Right lower leg: No edema.      Left lower leg: No edema.   Lymphadenopathy:      Cervical: No cervical adenopathy.   Skin:     General: Skin is warm and dry.      Capillary Refill: Capillary refill takes less than 2 seconds.      Findings: No erythema or rash.   Neurological:      Mental Status: He is alert and oriented to person, place, and time. Mental status is at baseline.   Psychiatric:         Attention and Perception: Attention normal.         Mood and Affect: Mood normal.         Speech: Speech normal.         Behavior: Behavior normal. Behavior is cooperative.         Thought Content: Thought content normal.         Judgment: Judgment normal.       Office Visit on 12/13/2024   Component Date Value Ref Range Status    POC Molecular Influenza A Ag 12/13/2024 Negative  Negative Final    POC Molecular Influenza B Ag 12/13/2024 Negative  Negative Final     Acceptable 12/13/2024 Yes   Final    POC Rapid COVID 12/13/2024 Negative  Negative Final     Acceptable 12/13/2024 Yes   Final    Molecular Strep A, POC 12/13/2024 Negative  Negative Final     Acceptable 12/13/2024 Yes   Final   Hospital Outpatient Visit on 08/20/2024   Component Date Value Ref Range Status    QRS Duration 08/20/2024 98  ms Final    [facility] QTC Calculation 08/20/2024 427  ms Final          Assessment:       1. Encounter to establish care    2. Acute cough    3. Sore throat    4. Nocturia    5. Other hyperlipidemia    6. Low testosterone in male    7. Insomnia, unspecified type    8. Decreased appetite    9. Acute non-recurrent pansinusitis    10. Need for hepatitis C screening test        Plan:       Assessment & Plan    IMPRESSION:  - Assessed patient's symptoms as likely viral or allergy-related based on negative  test results  - Evaluated cholesterol levels and cardiovascular risk, determining current numbers are acceptable without medication  - Evaluated urinary symptoms, suspecting possible BPH  - Reviewed history of low testosterone and steroid use    HYPERLIPIDEMIA AND CARDIOVASCULAR RISK:  - Explained ASCVD 10-year risk score and its implications for cholesterol management.  - Mr. Omer to continue current exercise regimen and healthy diet to maintain improved cholesterol levels.  - Lipid panel ordered to recheck cholesterol levels.  - Limit red meat, butter, fried foods, cheese, and other foods that have a lot of saturated fat.   - Consume more: lean meats, fish, fruits, vegetables, whole grains, beans, lentils, and nuts.     BENIGN PROSTATIC HYPERPLASIA:  - Discussed BPH symptoms and their impact on urinary function.  - Started Flomax (tamsulosin) daily for urinary symptoms.  - Urinalysis ordered.    SINUSITIS :  - Started Zyrtec (cetirizine) for allergy symptoms.  - Started Flonase (fluticasone) nasal spray for allergy symptoms.  - Doxycycline prescribed if symptoms fail to improved in 3-4 days.   - Started doxycycline for potential sinus infection, with instructions to limit sun exposure.     INSOMNIA:  - Refilled Remeron (mirtazapine) 15 mg, providing 90 tablets.    ANOREXIA:  - Started Megace (megestrol) oral suspension as an appetite stimulant.  - Continue Remeron 15 mg.     TESTICULAR HYPOFUNCTION:  - Testosterone level test ordered.    FOLLOW-UP:  - Follow up in 1 month to review new medications and complete any remaining healthcare maintenance.        Encounter to establish care    Acute cough  -     POCT Influenza A/B Molecular  -     POCT COVID-19 Rapid Screening  -     POCT Strep A, Molecular    Sore throat  -     POCT Strep A, Molecular    Nocturia  -     tamsulosin (FLOMAX) 0.4 mg Cap; Take 1 capsule (0.4 mg total) by mouth once daily.  Dispense: 30 capsule; Refill: 11  -     Urinalysis; Future; Expected  date: 12/13/2024    Other hyperlipidemia  -     Comprehensive Metabolic Panel; Future; Expected date: 12/13/2024  -     Lipid Panel; Future; Expected date: 12/13/2024  -     TSH; Future; Expected date: 12/13/2024    Low testosterone in male  -     Comprehensive Metabolic Panel; Future; Expected date: 12/13/2024  -     TSH; Future; Expected date: 12/13/2024  -     Testosterone; Future; Expected date: 12/13/2024    Insomnia, unspecified type  -     REMERON 15 mg tablet; Take 1 tablet (15 mg total) by mouth every evening.  Dispense: 90 tablet; Refill: 1    Decreased appetite  -     megestroL (MEGACE) 400 mg/10 mL (40 mg/mL) Susp; Take 10 mLs (400 mg total) by mouth once daily.  Dispense: 300 mL; Refill: 1    Acute non-recurrent pansinusitis  -     cetirizine (ZYRTEC) 10 MG tablet; Take 1 tablet (10 mg total) by mouth once daily.  Dispense: 30 tablet; Refill: 0  -     fluticasone propionate (FLONASE) 50 mcg/actuation nasal spray; 1 spray (50 mcg total) by Each Nostril route once daily.  Dispense: 16 g; Refill: 0  -     doxycycline (VIBRA-TABS) 100 MG tablet; Take 1 tablet (100 mg total) by mouth 2 (two) times daily. for 7 days  Dispense: 14 tablet; Refill: 0    Need for hepatitis C screening test  -     HEPATITIS C ANTIBODY; Future; Expected date: 12/13/2024    I spent a total of 30 minutes on the day of the visit.This includes face to face time and non-face to face time preparing to see the patient (eg, review of tests), obtaining and/or reviewing separately obtained history, documenting clinical information in the electronic or other health record, independently interpreting results and communicating results to the patient/family/caregiver, or care coordinator.    Follow up in about 1 month (around 1/13/2025) for lab review, nocturia, appetitie.          12/13/2024 VIVIEN Mejia, ROSIE    This note was generated with the assistance of ambient listening technology. Verbal consent was obtained by the patient  and accompanying visitor(s) for the recording of patient appointment to facilitate this note. I attest to having reviewed and edited the generated note for accuracy, though some syntax or spelling errors may persist. Please contact the author of this note for any clarification.

## 2024-12-18 ENCOUNTER — TELEPHONE (OUTPATIENT)
Dept: FAMILY MEDICINE | Facility: CLINIC | Age: 54
End: 2024-12-18
Payer: COMMERCIAL

## 2024-12-18 NOTE — TELEPHONE ENCOUNTER
----- Message from Shaye sent at 12/18/2024 12:29 PM CST -----  Pt was in a couple of days ago and is missing a refill on remeron name brand. He is almost out and can not miss this as he has had seizures in the past if he misses it   [facility]   [phone]

## 2024-12-18 NOTE — TELEPHONE ENCOUNTER
Spoke to pt and informed him that his pharmacy does not have the brand name and he would have to get the generic refilled.

## 2024-12-18 NOTE — TELEPHONE ENCOUNTER
----- Message from Perla sent at 12/18/2024  2:51 PM CST -----  The patient needs his Remeron the brand name called in. He said the generic was called in. Can you resend the brand name in. [facility] in [place]. Pt's # [phone] GH

## 2024-12-22 NOTE — H&P
GASTROENTEROLOGY PRE-PROCEDURE H&P NOTE  Patient Name: Michael Omer Jr.  Patient MRN: [id number]  Patient : 1970    Service date: 2024    PCP: Rehan Lugo FNP-C    No chief complaint on file.      HPI: Patient is a 54 y.o. male with PMHx as below here for evaluation of for screening.     Past Medical History:  Past Medical History:   Diagnosis Date    Depression         Past Surgical History:  Past Surgical History:   Procedure Laterality Date    COLONOSCOPY N/A 2024    Procedure: COLONOSCOPY;  Surgeon: Paul Ventura MD;  Location: [facility];  Service: Endoscopy;  Laterality: N/A;    KNEE SURGERY Left     SHOULDER SURGERY Left 1996    reconstruction        Home Medications:  No medications prior to admission.               Review of patient's allergies indicates:   Allergen Reactions    Bupropion Other (See Comments)     Seizures     Seizures    Venom-honey bee Swelling    Wellbutrin [bupropion hcl] Other (See Comments)     Caused seizures       Social History:   Social History     Occupational History    Not on file   Tobacco Use    Smoking status: Every Day     Types: Vaping with nicotine    Smokeless tobacco: Current   Substance and Sexual Activity    Alcohol use: No    Drug use: Yes     Types: Marijuana    Sexual activity: Not on file       Family History:   No family history on file.    Review of Systems:  A 10 point review of systems was performed and was normal, except as mentioned in the HPI, including constitutional, HEENT, heme, lymph, cardiovascular, respiratory, gastrointestinal, genitourinary, neurologic, endocrine, psychiatric and musculoskeletal.      OBJECTIVE:    Physical Exam:  24 Hour Vital Sign Ranges:    Most recent vitals: BP (!) 105/56   Pulse 62   Temp 97.7 °F (36.5 °C)   Resp 18   SpO2 98%    GEN: well-developed, well-nourished, awake and alert, non-toxic appearing adult  HEENT: PERRL, sclera anicteric, oral mucosa pink and moist without  "lesion  NECK: trachea midline; Good ROM  CV: regular rate and rhythm, no murmurs or gallops  RESP: clear to auscultation bilaterally, no wheezes, rhonci or rales  ABD: soft, non-tender, non-distended, normal bowel sounds  EXT: no swelling or edema, 2+ pulses distally  SKIN: no rashes or jaundice  PSYCH: normal affect    Labs:   No results for input(s): "WBC", "MCV", "PLT" in the last 72 hours.    Invalid input(s): "HGBAU"  No results for input(s): "NA", "K", "CL", "CO2", "BUN", "GLU" in the last 72 hours.    Invalid input(s): "CREA"  No results for input(s): "ALB" in the last 72 hours.    Invalid input(s): "ALKP", "SGOT", "SGPT", "TBIL", "DBIL", "TPRO"  No results for input(s): "PT", "INR", "PTT" in the last 72 hours.      IMPRESSION / RECOMMENDATIONS:  Screening  -cscope  with interventions as warranted.   RIsks, benefits, alternatives discussed in detail regarding upcoming procedures and sedation. Some of the more common endoscopic complications include but not limited to immediate or delayed perforation, bleeding, infections, pain, inadvertent injury to surrounding tissue / organs and possible need for surgical evaluation. Patient expressed understanding, all questions answered and will proceed with procedure as planned.            "

## 2024-12-30 ENCOUNTER — TELEPHONE (OUTPATIENT)
Dept: FAMILY MEDICINE | Facility: CLINIC | Age: 54
End: 2024-12-30
Payer: COMMERCIAL

## 2024-12-30 NOTE — TELEPHONE ENCOUNTER
----- Message from Med Assistant Cantu sent at 12/27/2024  8:54 AM CST -----  Pt is requesting same day apt he's been coughing,sore throat, congested and diarrhea     Pt# [phone]

## 2024-12-30 NOTE — TELEPHONE ENCOUNTER
----- Message from Jessica sent at 12/27/2024  8:13 AM CST -----  fever,sore throat,body aches patient asking to be seen today.   [phone]

## 2025-01-07 DIAGNOSIS — J01.40 ACUTE NON-RECURRENT PANSINUSITIS: ICD-10-CM

## 2025-01-07 RX ORDER — CETIRIZINE HYDROCHLORIDE 10 MG/1
10 TABLET ORAL DAILY
Qty: 90 TABLET | Refills: 1 | Status: SHIPPED | OUTPATIENT
Start: 2025-01-07

## 2025-01-09 ENCOUNTER — LAB VISIT (OUTPATIENT)
Dept: LAB | Facility: HOSPITAL | Age: 55
End: 2025-01-09
Attending: NURSE PRACTITIONER
Payer: COMMERCIAL

## 2025-01-09 DIAGNOSIS — R35.1 NOCTURIA: ICD-10-CM

## 2025-01-09 DIAGNOSIS — Z11.59 NEED FOR HEPATITIS C SCREENING TEST: ICD-10-CM

## 2025-01-09 DIAGNOSIS — E78.49 OTHER HYPERLIPIDEMIA: ICD-10-CM

## 2025-01-09 DIAGNOSIS — R79.89 LOW TESTOSTERONE: Primary | ICD-10-CM

## 2025-01-09 LAB
ALBUMIN SERPL BCP-MCNC: 4 G/DL (ref 3.5–5.2)
ALP SERPL-CCNC: 65 U/L (ref 40–150)
ALT SERPL W/O P-5'-P-CCNC: 24 U/L (ref 10–44)
ANION GAP SERPL CALC-SCNC: 7 MMOL/L (ref 8–16)
AST SERPL-CCNC: 26 U/L (ref 10–40)
BILIRUB SERPL-MCNC: 0.6 MG/DL (ref 0.1–1)
BUN SERPL-MCNC: 7 MG/DL (ref 6–20)
CALCIUM SERPL-MCNC: 8.8 MG/DL (ref 8.7–10.5)
CHLORIDE SERPL-SCNC: 103 MMOL/L (ref 95–110)
CHOLEST SERPL-MCNC: 148 MG/DL (ref 120–199)
CHOLEST/HDLC SERPL: 3.6 {RATIO} (ref 2–5)
CO2 SERPL-SCNC: 30 MMOL/L (ref 23–29)
CREAT SERPL-MCNC: 0.8 MG/DL (ref 0.5–1.4)
EST. GFR  (NO RACE VARIABLE): >60 ML/MIN/1.73 M^2
GLUCOSE SERPL-MCNC: 86 MG/DL (ref 70–110)
HDLC SERPL-MCNC: 41 MG/DL (ref 40–75)
HDLC SERPL: 27.7 % (ref 20–50)
LDLC SERPL CALC-MCNC: 87.2 MG/DL (ref 63–159)
NONHDLC SERPL-MCNC: 107 MG/DL
POTASSIUM SERPL-SCNC: 3.9 MMOL/L (ref 3.5–5.1)
PROT SERPL-MCNC: 6.7 G/DL (ref 6–8.4)
SODIUM SERPL-SCNC: 140 MMOL/L (ref 136–145)
TRIGL SERPL-MCNC: 99 MG/DL (ref 30–150)

## 2025-01-09 PROCEDURE — 80053 COMPREHEN METABOLIC PANEL: CPT | Performed by: NURSE PRACTITIONER

## 2025-01-09 PROCEDURE — 80061 LIPID PANEL: CPT | Performed by: NURSE PRACTITIONER

## 2025-01-09 PROCEDURE — 36415 COLL VENOUS BLD VENIPUNCTURE: CPT | Performed by: NURSE PRACTITIONER

## 2025-01-09 PROCEDURE — 86803 HEPATITIS C AB TEST: CPT | Performed by: NURSE PRACTITIONER

## 2025-01-09 PROCEDURE — 84443 ASSAY THYROID STIM HORMONE: CPT | Performed by: NURSE PRACTITIONER

## 2025-01-09 PROCEDURE — 84403 ASSAY OF TOTAL TESTOSTERONE: CPT | Performed by: NURSE PRACTITIONER

## 2025-01-10 LAB
HCV AB SERPL QL IA: NORMAL
TESTOST SERPL-MCNC: 723 NG/DL (ref 304–1227)
TSH SERPL DL<=0.005 MIU/L-ACNC: 0.71 UIU/ML (ref 0.4–4)

## 2025-01-13 ENCOUNTER — TELEPHONE (OUTPATIENT)
Dept: FAMILY MEDICINE | Facility: CLINIC | Age: 55
End: 2025-01-13
Payer: COMMERCIAL

## 2025-01-13 NOTE — TELEPHONE ENCOUNTER
----- Message from Barbra sent at 1/13/2025  3:44 PM CST -----  Pt wants his lab results. Pt #[phone]

## 2025-02-03 ENCOUNTER — OCCUPATIONAL HEALTH (OUTPATIENT)
Dept: URGENT CARE | Facility: CLINIC | Age: 55
End: 2025-02-03

## 2025-02-03 DIAGNOSIS — Z02.83 ENCOUNTER FOR DRUG SCREENING: Primary | ICD-10-CM

## 2025-02-03 PROCEDURE — 80305 DRUG TEST PRSMV DIR OPT OBS: CPT | Mod: S$GLB,,, | Performed by: EMERGENCY MEDICINE
